# Patient Record
Sex: MALE | Race: BLACK OR AFRICAN AMERICAN | NOT HISPANIC OR LATINO | Employment: OTHER | URBAN - METROPOLITAN AREA
[De-identification: names, ages, dates, MRNs, and addresses within clinical notes are randomized per-mention and may not be internally consistent; named-entity substitution may affect disease eponyms.]

---

## 2023-03-03 LAB
ACTIVATED PARTIAL THROMBOPLASTIN TIME IN PPP BY COAGULATION ASSAY: 33 SEC (ref 26–39)
ALANINE AMINOTRANSFERASE (SGPT) (U/L) IN SER/PLAS: 10 U/L (ref 10–52)
ALBUMIN (G/DL) IN SER/PLAS: 4.2 G/DL (ref 3.4–5)
ALKALINE PHOSPHATASE (U/L) IN SER/PLAS: 113 U/L (ref 33–136)
ANION GAP IN SER/PLAS: 14 MMOL/L (ref 10–20)
ASPARTATE AMINOTRANSFERASE (SGOT) (U/L) IN SER/PLAS: 26 U/L (ref 9–39)
BILIRUBIN TOTAL (MG/DL) IN SER/PLAS: 0.5 MG/DL (ref 0–1.2)
C REACTIVE PROTEIN (MG/L) IN SER/PLAS BY HIGH SENSIT: 2.4 MG/L
CALCIUM (MG/DL) IN SER/PLAS: 9.7 MG/DL (ref 8.6–10.6)
CARBON DIOXIDE, TOTAL (MMOL/L) IN SER/PLAS: 28 MMOL/L (ref 21–32)
CHLORIDE (MMOL/L) IN SER/PLAS: 104 MMOL/L (ref 98–107)
CREATININE (MG/DL) IN SER/PLAS: 1.37 MG/DL (ref 0.5–1.3)
ERYTHROCYTE DISTRIBUTION WIDTH (RATIO) BY AUTOMATED COUNT: 13.3 % (ref 11.5–14.5)
ERYTHROCYTE MEAN CORPUSCULAR HEMOGLOBIN CONCENTRATION (G/DL) BY AUTOMATED: 32.1 G/DL (ref 32–36)
ERYTHROCYTE MEAN CORPUSCULAR VOLUME (FL) BY AUTOMATED COUNT: 96 FL (ref 80–100)
ERYTHROCYTES (10*6/UL) IN BLOOD BY AUTOMATED COUNT: 4.72 X10E12/L (ref 4.5–5.9)
GFR MALE: 53 ML/MIN/1.73M2
GLUCOSE (MG/DL) IN SER/PLAS: 68 MG/DL (ref 74–99)
HEMATOCRIT (%) IN BLOOD BY AUTOMATED COUNT: 45.5 % (ref 41–52)
HEMOGLOBIN (G/DL) IN BLOOD: 14.6 G/DL (ref 13.5–17.5)
INR IN PPP BY COAGULATION ASSAY: 1 (ref 0.9–1.1)
LEUKOCYTES (10*3/UL) IN BLOOD BY AUTOMATED COUNT: 5.6 X10E9/L (ref 4.4–11.3)
NRBC (PER 100 WBCS) BY AUTOMATED COUNT: 0 /100 WBC (ref 0–0)
PLATELETS (10*3/UL) IN BLOOD AUTOMATED COUNT: 171 X10E9/L (ref 150–450)
POTASSIUM (MMOL/L) IN SER/PLAS: 4 MMOL/L (ref 3.5–5.3)
PROTEIN TOTAL: 6.6 G/DL (ref 6.4–8.2)
PROTHROMBIN TIME (PT) IN PPP BY COAGULATION ASSAY: 11.8 SEC (ref 9.8–13.4)
SODIUM (MMOL/L) IN SER/PLAS: 142 MMOL/L (ref 136–145)
UREA NITROGEN (MG/DL) IN SER/PLAS: 18 MG/DL (ref 6–23)

## 2023-03-06 LAB
ANA PATTERN: ABNORMAL
ANA TITER: ABNORMAL
ANTI-CENTROMERE: <0.2 AI
ANTI-CHROMATIN: <0.2 AI
ANTI-DNA (DS): 1 IU/ML
ANTI-JO-1 IGG: <0.2 AI
ANTI-NUCLEAR ANTIBODY (ANA): POSITIVE
ANTI-RIBOSOMAL P: <0.2 AI
ANTI-RNP: <0.2 AI
ANTI-SCL-70: <0.2 AI
ANTI-SM/RNP: <0.2 AI
ANTI-SM: <0.2 AI
ANTI-SSA: <0.2 AI
ANTI-SSB: <0.2 AI
RHEUMATOID FACTOR (IU/ML) IN SERUM OR PLASMA: <10 IU/ML (ref 0–15)
SEDIMENTATION RATE, ERYTHROCYTE: 6 MM/H (ref 0–20)
URATE (MG/DL) IN SER/PLAS: 6.1 MG/DL (ref 4–7.5)

## 2023-03-11 DIAGNOSIS — E78.5 HYPERLIPIDEMIA, UNSPECIFIED HYPERLIPIDEMIA TYPE: Primary | ICD-10-CM

## 2023-03-11 RX ORDER — ATORVASTATIN CALCIUM 40 MG/1
1 TABLET, FILM COATED ORAL NIGHTLY
COMMUNITY
Start: 2019-04-01 | End: 2023-03-11 | Stop reason: SDUPTHER

## 2023-03-11 RX ORDER — ATORVASTATIN CALCIUM 40 MG/1
40 TABLET, FILM COATED ORAL NIGHTLY
Qty: 90 TABLET | Refills: 3 | Status: SHIPPED | OUTPATIENT
Start: 2023-03-11

## 2023-04-04 ENCOUNTER — OFFICE VISIT (OUTPATIENT)
Dept: PRIMARY CARE | Facility: CLINIC | Age: 78
End: 2023-04-04
Payer: MEDICARE

## 2023-04-04 VITALS
SYSTOLIC BLOOD PRESSURE: 130 MMHG | TEMPERATURE: 98.5 F | OXYGEN SATURATION: 98 % | BODY MASS INDEX: 23 KG/M2 | HEART RATE: 133 BPM | DIASTOLIC BLOOD PRESSURE: 83 MMHG | WEIGHT: 185 LBS | HEIGHT: 75 IN

## 2023-04-04 DIAGNOSIS — I10 PRIMARY HYPERTENSION: ICD-10-CM

## 2023-04-04 DIAGNOSIS — Z87.2: Primary | ICD-10-CM

## 2023-04-04 PROBLEM — F17.200 NICOTINE DEPENDENCE, UNSPECIFIED, UNCOMPLICATED: Status: ACTIVE | Noted: 2023-04-04

## 2023-04-04 PROBLEM — R25.1 TREMOR, UNSPECIFIED: Status: ACTIVE | Noted: 2023-04-04

## 2023-04-04 PROBLEM — Z96.1 PSEUDOPHAKIA OF LEFT EYE: Status: ACTIVE | Noted: 2023-04-04

## 2023-04-04 PROBLEM — I70.90 ATHEROSCLEROTIC VASCULAR DISEASE: Status: ACTIVE | Noted: 2023-04-04

## 2023-04-04 PROBLEM — H53.8 BLURRY VISION: Status: ACTIVE | Noted: 2023-04-04

## 2023-04-04 PROBLEM — N18.32 STAGE 3B CHRONIC KIDNEY DISEASE (MULTI): Status: ACTIVE | Noted: 2023-04-04
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
## 2023-04-04 PROBLEM — H52.13 MYOPIA OF BOTH EYES WITH REGULAR ASTIGMATISM: Status: ACTIVE | Noted: 2023-04-04

## 2023-04-04 PROBLEM — H52.02 HYPERMETROPIA OF LEFT EYE: Status: ACTIVE | Noted: 2023-04-04

## 2023-04-04 PROBLEM — H43.811 PVD (POSTERIOR VITREOUS DETACHMENT), RIGHT EYE: Status: ACTIVE | Noted: 2023-04-04

## 2023-04-04 PROBLEM — G20.A1 PARKINSON'S DISEASE (MULTI): Status: ACTIVE | Noted: 2023-04-04

## 2023-04-04 PROBLEM — E87.6 HYPOKALEMIA: Status: ACTIVE | Noted: 2023-04-04

## 2023-04-04 PROBLEM — H52.223 REGULAR ASTIGMATISM OF BOTH EYES WITH PRESBYOPIA: Status: ACTIVE | Noted: 2023-04-04

## 2023-04-04 PROBLEM — I72.4 ANEURYSM OF POPLITEAL ARTERY (CMS-HCC): Status: ACTIVE | Noted: 2023-04-04

## 2023-04-04 PROBLEM — F17.210 CIGARETTE NICOTINE DEPENDENCE WITHOUT COMPLICATION: Status: ACTIVE | Noted: 2023-04-04

## 2023-04-04 PROBLEM — L85.3 DRY SKIN: Status: ACTIVE | Noted: 2023-04-04

## 2023-04-04 PROBLEM — H25.812 COMBINED FORM OF AGE-RELATED CATARACT, LEFT EYE: Status: ACTIVE | Noted: 2023-04-04

## 2023-04-04 PROBLEM — H52.223 MYOPIA OF BOTH EYES WITH REGULAR ASTIGMATISM: Status: ACTIVE | Noted: 2023-04-04

## 2023-04-04 PROBLEM — Z96.1 PSEUDOPHAKIA OF RIGHT EYE: Status: ACTIVE | Noted: 2023-04-04

## 2023-04-04 PROBLEM — H52.4 REGULAR ASTIGMATISM OF BOTH EYES WITH PRESBYOPIA: Status: ACTIVE | Noted: 2023-04-04

## 2023-04-04 PROBLEM — F17.210 SMOKES WITH GREATER THAN 40 PACK YEAR HISTORY: Status: ACTIVE | Noted: 2023-04-04

## 2023-04-04 PROBLEM — K59.00 CONSTIPATION: Status: ACTIVE | Noted: 2023-04-04

## 2023-04-04 PROBLEM — E78.00 HYPERCHOLESTEREMIA: Status: ACTIVE | Noted: 2023-04-04

## 2023-04-04 PROBLEM — F17.200 TOBACCO DEPENDENCE: Status: ACTIVE | Noted: 2023-04-04

## 2023-04-04 PROBLEM — H52.11 MYOPIA OF RIGHT EYE: Status: ACTIVE | Noted: 2023-04-04

## 2023-04-04 PROBLEM — I71.43 ANEURYSM OF INFRARENAL ABDOMINAL AORTA (CMS-HCC): Status: ACTIVE | Noted: 2023-04-04

## 2023-04-04 PROBLEM — H25.813 COMBINED FORM OF SENILE CATARACT OF BOTH EYES: Status: ACTIVE | Noted: 2023-04-04

## 2023-04-04 PROBLEM — Z95.828 PRESENCE OF OTHER VASCULAR IMPLANTS AND GRAFTS: Status: ACTIVE | Noted: 2023-04-04

## 2023-04-04 PROBLEM — B35.9 TINEA: Status: ACTIVE | Noted: 2023-04-04

## 2023-04-04 PROCEDURE — 4004F PT TOBACCO SCREEN RCVD TLK: CPT | Performed by: STUDENT IN AN ORGANIZED HEALTH CARE EDUCATION/TRAINING PROGRAM

## 2023-04-04 PROCEDURE — 99214 OFFICE O/P EST MOD 30 MIN: CPT | Performed by: STUDENT IN AN ORGANIZED HEALTH CARE EDUCATION/TRAINING PROGRAM

## 2023-04-04 PROCEDURE — 3075F SYST BP GE 130 - 139MM HG: CPT | Performed by: STUDENT IN AN ORGANIZED HEALTH CARE EDUCATION/TRAINING PROGRAM

## 2023-04-04 PROCEDURE — 3079F DIAST BP 80-89 MM HG: CPT | Performed by: STUDENT IN AN ORGANIZED HEALTH CARE EDUCATION/TRAINING PROGRAM

## 2023-04-04 PROCEDURE — 1159F MED LIST DOCD IN RCRD: CPT | Performed by: STUDENT IN AN ORGANIZED HEALTH CARE EDUCATION/TRAINING PROGRAM

## 2023-04-04 RX ORDER — ASPIRIN 81 MG/1
1 TABLET ORAL DAILY
COMMUNITY
Start: 2013-11-13

## 2023-04-04 RX ORDER — TRIAMTERENE AND HYDROCHLOROTHIAZIDE 37.5; 25 MG/1; MG/1
1 CAPSULE ORAL DAILY PRN
COMMUNITY
Start: 2020-09-30 | End: 2023-04-04 | Stop reason: SDUPTHER

## 2023-04-04 RX ORDER — NAPROXEN SODIUM 220 MG/1
1 TABLET, FILM COATED ORAL DAILY
COMMUNITY
Start: 2016-08-25

## 2023-04-04 RX ORDER — MULTIVIT-MINS/IRON/FOLIC/LYCOP 8-200-600
1 TABLET ORAL DAILY
COMMUNITY
Start: 2022-11-08

## 2023-04-04 RX ORDER — METOPROLOL SUCCINATE 25 MG/1
25 TABLET, EXTENDED RELEASE ORAL DAILY
Qty: 90 TABLET | Refills: 3 | Status: SHIPPED | OUTPATIENT
Start: 2023-04-04

## 2023-04-04 RX ORDER — CARBIDOPA AND LEVODOPA 25; 100 MG/1; MG/1
1 TABLET ORAL 3 TIMES DAILY
COMMUNITY
Start: 2021-12-16

## 2023-04-04 RX ORDER — METOPROLOL SUCCINATE 25 MG/1
1 TABLET, EXTENDED RELEASE ORAL DAILY
COMMUNITY
Start: 2017-05-10 | End: 2023-04-04 | Stop reason: SDUPTHER

## 2023-04-04 RX ORDER — TRIAMTERENE AND HYDROCHLOROTHIAZIDE 37.5; 25 MG/1; MG/1
1 CAPSULE ORAL EVERY MORNING
Qty: 90 CAPSULE | Refills: 3 | Status: SHIPPED | OUTPATIENT
Start: 2023-04-04

## 2023-04-04 RX ORDER — METOPROLOL TARTRATE 25 MG/1
1 TABLET, FILM COATED ORAL DAILY
COMMUNITY
End: 2023-04-04 | Stop reason: ALTCHOICE

## 2023-04-04 ASSESSMENT — ENCOUNTER SYMPTOMS
DEPRESSION: 0
LOSS OF SENSATION IN FEET: 0
OCCASIONAL FEELINGS OF UNSTEADINESS: 0

## 2023-04-04 ASSESSMENT — PAIN SCALES - GENERAL: PAINLEVEL: 0-NO PAIN

## 2023-04-04 NOTE — PROGRESS NOTES
Subjective   Patient ID: Jonny Gonzalez is a 77 y.o. male who presents for Follow-up and Med Refill.    HPI    Mr. 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   HENT:  Negative for congestion.    Eyes:  Negative for pain.   Respiratory:  Negative for cough, chest tightness, shortness of breath and wheezing.    Cardiovascular:  Negative for chest pain, palpitations and leg swelling.   Gastrointestinal:  Negative for abdominal distention, abdominal pain, constipation, diarrhea, nausea and vomiting.   Genitourinary:  Negative for dysuria.   Musculoskeletal:  Negative for arthralgias, back pain, joint swelling and myalgias.   Skin:  Negative for color change and rash.   Neurological:  Negative for weakness, numbness and headaches.   Psychiatric/Behavioral:  Negative for behavioral problems.        Objective   Physical Exam  Constitutional:       Appearance: Normal appearance. He is normal weight.   HENT:      Head: Normocephalic and atraumatic.      Nose: Nose normal.   Eyes:      Extraocular Movements: Extraocular movements intact.      Conjunctiva/sclera: Conjunctivae normal.      Pupils: Pupils are equal, round, and reactive to light.   Cardiovascular:      Rate and Rhythm: Normal rate and regular rhythm.      Pulses: Normal pulses.      Heart sounds: Normal heart sounds.   Pulmonary:      Effort: Pulmonary effort is normal.       "Breath sounds: Normal breath sounds.   Abdominal:      General: Abdomen is flat. Bowel sounds are normal.      Palpations: Abdomen is soft.   Musculoskeletal:         General: Normal range of motion.      Cervical back: Normal range of motion and neck supple.   Skin:     General: Skin is warm and dry.      Capillary Refill: Capillary refill takes less than 2 seconds.   Neurological:      General: No focal deficit present.      Mental Status: He is alert and oriented to person, place, and time. Mental status is at baseline.      Gait: Gait abnormal.      Comments: Chronic tremors   Psychiatric:         Mood and Affect: Mood normal.         Behavior: Behavior normal.       /83 (BP Location: Right arm, Patient Position: Sitting)   Pulse (!) 133   Temp 36.9 °C (98.5 °F)   Ht 1.905 m (6' 3\")   Wt 83.9 kg (185 lb)   SpO2 98%   BMI 23.12 kg/m²      Assessment/Plan   Problem List Items Addressed This Visit    None    Mr. 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, HLD, resting tremor, and HTN presenting today for rash on lower bilateral knees:    #Skin hyperpigmentation of bilateral knees  - Lesions on bilateral knees with hyperpigmentation that is minimally blanchable that appears to be possibly vascular in nature  - Unclear if drug reaction (Carbidopa-Levodopa?)  - CRP, ESR, CMP, CBC, and Arthiritis Panel ordered previously to rule-out systemic origin of symptoms with no clear origin identified  - Dermatology referral for further review    #Hypertension  - Refilled Metop Succinate 25mg PO daily  - Refilled Triamterene-hydrochlorothiazide 37.5mg-25mg PO daily  - Monitor Bps periodically at home    Plan to follow-up in 1 month or sooner if symptoms worsen    Patient seen and discussed with Dr. Bucky Glynn MD  Family and Preventive Medicine  PGY-3   "

## 2023-04-10 ASSESSMENT — ENCOUNTER SYMPTOMS
PALPITATIONS: 0
WEAKNESS: 0
HEADACHES: 0
COLOR CHANGE: 0
MYALGIAS: 0
WHEEZING: 0
ABDOMINAL PAIN: 0
NUMBNESS: 0
NAUSEA: 0
DYSURIA: 0
BACK PAIN: 0
CHILLS: 0
COUGH: 0
VOMITING: 0
EYE PAIN: 0
FEVER: 0
CONSTIPATION: 0
CHEST TIGHTNESS: 0
SHORTNESS OF BREATH: 0
JOINT SWELLING: 0
ARTHRALGIAS: 0
ABDOMINAL DISTENTION: 0
DIARRHEA: 0

## 2023-04-11 NOTE — PROGRESS NOTES
I saw and evaluated the patient. I personally obtained the key and critical portions of the history and physical exam or was physically present for key and critical portions performed by the resident/fellow. I reviewed the resident/fellow's documentation and discussed the patient with the resident/fellow. I agree with the resident/fellow's medical decision making as documented in the note with the exception/addition of the following:  Here for follow up leg lesions: telangiectasis?? : work up reviewed and mostly negative: derm refrral  HTN: meds refilled.  Soledad Lawler MD

## 2023-12-06 ENCOUNTER — OFFICE VISIT (OUTPATIENT)
Dept: VASCULAR SURGERY | Facility: HOSPITAL | Age: 78
End: 2023-12-06
Payer: MEDICARE

## 2023-12-06 VITALS
HEART RATE: 78 BPM | HEIGHT: 75 IN | WEIGHT: 186 LBS | DIASTOLIC BLOOD PRESSURE: 91 MMHG | BODY MASS INDEX: 23.13 KG/M2 | OXYGEN SATURATION: 97 % | SYSTOLIC BLOOD PRESSURE: 143 MMHG

## 2023-12-06 DIAGNOSIS — I71.43 INFRARENAL ABDOMINAL AORTIC ANEURYSM (AAA) WITHOUT RUPTURE (CMS-HCC): Primary | ICD-10-CM

## 2023-12-06 DIAGNOSIS — I72.4 ANEURYSM OF POPLITEAL ARTERY (CMS-HCC): ICD-10-CM

## 2023-12-06 PROCEDURE — 3080F DIAST BP >= 90 MM HG: CPT | Performed by: NURSE PRACTITIONER

## 2023-12-06 PROCEDURE — 99204 OFFICE O/P NEW MOD 45 MIN: CPT | Performed by: NURSE PRACTITIONER

## 2023-12-06 PROCEDURE — 3077F SYST BP >= 140 MM HG: CPT | Performed by: NURSE PRACTITIONER

## 2023-12-06 PROCEDURE — 1126F AMNT PAIN NOTED NONE PRSNT: CPT | Performed by: NURSE PRACTITIONER

## 2023-12-06 PROCEDURE — 99214 OFFICE O/P EST MOD 30 MIN: CPT | Performed by: NURSE PRACTITIONER

## 2023-12-06 PROCEDURE — 1159F MED LIST DOCD IN RCRD: CPT | Performed by: NURSE PRACTITIONER

## 2023-12-06 PROCEDURE — 4004F PT TOBACCO SCREEN RCVD TLK: CPT | Performed by: NURSE PRACTITIONER

## 2023-12-06 RX ORDER — METHOCARBAMOL 750 MG/1
TABLET, FILM COATED ORAL
COMMUNITY
Start: 2023-11-30

## 2023-12-06 ASSESSMENT — PAIN SCALES - GENERAL: PAINLEVEL: 0-NO PAIN

## 2023-12-06 NOTE — PROGRESS NOTES
Vascular Surgery Clinic Note    CC: NPV     History Of Present Illness:   Jonny Gonzalez is a 77 y.o. male here for initial evaluation.  He underwent an EVAR in 2014 by Dr. Rausch.  Has a known right popliteal artery aneurysm.  Over the past 2 months he has experienced right leg discomfort with walking less than 100 yards distance.  He denies nocturnal rest pain or tissue loss.  He denies any known history of diabetes.  He smokes 1 pack/day.  He denies any known family history of aneurysmal disease.  His blood pressure is well controlled at home.    He denies amaurosis fugax or TIA symptoms.    Last CT was performed in 2015.     Medical History:  Patient Active Problem List   Diagnosis    Aneurysm of infrarenal abdominal aorta (CMS/HCC)    Aneurysm of popliteal artery (CMS/HCC)    Atherosclerotic vascular disease    Blurry vision    Combined form of age-related cataract, left eye    Combined form of senile cataract of both eyes    Constipation    Dry skin    Hypercholesteremia    Hypermetropia of left eye    Hypertension    Hypokalemia    Insect bite    Myopia of both eyes with regular astigmatism    Myopia of right eye    Parkinson's disease    Presence of other vascular implants and grafts    Pseudophakia of left eye    Pseudophakia of right eye    PVD (posterior vitreous detachment), right eye    Regular astigmatism of both eyes with presbyopia    Smokes with greater than 40 pack year history    Stage 3b chronic kidney disease (CMS/HCC)    Tinea    Nicotine dependence, unspecified, uncomplicated    Tobacco dependence    Tremor, unspecified    Cigarette nicotine dependence without complication    Endoleak post (EVAR) endovascular aneurysm repair, initial encounter    Intertrigo    Low back pain    Nail disease    Prediabetes    Pruritus ani    Subconjunctival hemorrhage        SH:    Social Determinants of Health     Tobacco Use: High Risk (12/6/2023)    Patient History     Smoking Tobacco Use: Every Day      "Smokeless Tobacco Use: Never     Passive Exposure: Not on file   Alcohol Use: Not on file   Financial Resource Strain: Not on file   Food Insecurity: Not on file   Transportation Needs: Not on file   Physical Activity: Not on file   Stress: Not on file   Social Connections: Not on file   Intimate Partner Violence: Not on file   Depression: Not on file   Housing Stability: Not on file   Utilities: Not on file   Digital Equity: Not on file        FH:  No family history on file.     Allergies:   No Known Allergies    ROS:  All systems were reviewed and noted to be negative, other than described above.     Objective:  Last Recorded Vitals  Blood pressure (!) 143/91, pulse 78, height 1.905 m (6' 3\"), weight 84.4 kg (186 lb), SpO2 97 %.    Meds:   Current Outpatient Medications   Medication Instructions    aspirin 81 mg chewable tablet 1 tablet, oral, Daily    aspirin 81 mg EC tablet 1 tablet, oral, Daily    atorvastatin (LIPITOR) 40 mg, oral, Nightly    carbidopa-levodopa (Sinemet)  mg tablet 1 tablet, oral, 3 times daily    methocarbamol (Robaxin) 750 mg tablet TAKE 1 TABLET BY MOUTH EVERY 8 HOURS AS NEEDED FOR LEG PAIN OR CRAMPING    metoprolol succinate XL (TOPROL-XL) 25 mg, oral, Daily    multivit-min-FA-lycopen-lutein (Men 50 Plus Multivitamin) 300-600-300 mcg tablet 1 tablet, oral, Daily    triamterene-hydrochlorothiazid (Dyazide) 37.5-25 mg capsule 1 capsule, oral, Every morning       Exam:  Constitutional: Well appearing, NAD   PSYCH: Appropriate mood and affect  Eyes: Sclera clear   Neck: Supple, no carotid bruits noted.   CV: No tachycardia   RESP: Unlabored breathing   GI: Soft, nontender, non-distended. Non tender and nonaneurysmal abdominal aorta.   SKIN: No lesions  NEURO: No focal deficits noted. Motor/sensory intact.   EXTREMITIES: Warm & well perfused. No leg edema. No evidence of arterial ischemia. No evidence of venous insufficiency.   PULSES: palpable femoral pulse bilaterally. Palpable L DP. " Monophasic right PT and peroneal signal. No audible AT/DP doppler signal. Nonpalpable right popliteal pulse.     Assessment & Plan:  1. Infrarenal abdominal aortic aneurysm (AAA) without rupture (CMS/HCC)  CT angio aorta and bilateral iliofemoral runoff w and or wo IV contrast    CT angio aorta and bilateral iliofemoral runoff w and or wo IV contrast      2. Aneurysm of popliteal artery (CMS/HCC)  CT angio aorta and bilateral iliofemoral runoff w and or wo IV contrast    Vascular US ankle brachial index (TITA) without exercise    CT angio aorta and bilateral iliofemoral runoff w and or wo IV contrast        AAA s/p EVAR 2014  Right popliteal artery aneurysm - new claudication symptoms, no rest pain or tissue loss.   - obtain CTA aorta with runoff (instructed patient on oral hydration prior to scan)   - obtain TITA  - smoking cessation   - RTC after testing     Orders:  Orders Placed This Encounter   Procedures    CT angio aorta and bilateral iliofemoral runoff w and or wo IV contrast    CT angio aorta and bilateral iliofemoral runoff w and or wo IV contrast     Follow-up:  After testing     SERINA Gorman-CNP

## 2023-12-20 ENCOUNTER — HOSPITAL ENCOUNTER (OUTPATIENT)
Dept: VASCULAR MEDICINE | Facility: HOSPITAL | Age: 78
Discharge: HOME | End: 2023-12-20
Payer: MEDICARE

## 2023-12-20 DIAGNOSIS — I71.43 INFRARENAL ABDOMINAL AORTIC ANEURYSM, WITHOUT RUPTURE (CMS-HCC): Primary | ICD-10-CM

## 2023-12-20 DIAGNOSIS — I73.89 OTHER SPECIFIED PERIPHERAL VASCULAR DISEASES (CMS-HCC): ICD-10-CM

## 2023-12-20 DIAGNOSIS — I72.4 ANEURYSM OF POPLITEAL ARTERY (CMS-HCC): ICD-10-CM

## 2023-12-20 PROCEDURE — 93922 UPR/L XTREMITY ART 2 LEVELS: CPT

## 2023-12-20 PROCEDURE — 93922 UPR/L XTREMITY ART 2 LEVELS: CPT | Performed by: SURGERY

## 2023-12-21 ENCOUNTER — LAB (OUTPATIENT)
Dept: LAB | Facility: HOSPITAL | Age: 78
End: 2023-12-21
Payer: MEDICARE

## 2023-12-21 ENCOUNTER — HOSPITAL ENCOUNTER (OUTPATIENT)
Dept: RADIOLOGY | Facility: HOSPITAL | Age: 78
Discharge: HOME | End: 2023-12-21
Payer: MEDICARE

## 2023-12-21 DIAGNOSIS — I72.4 ANEURYSM OF POPLITEAL ARTERY (CMS-HCC): ICD-10-CM

## 2023-12-21 DIAGNOSIS — I71.43 INFRARENAL ABDOMINAL AORTIC ANEURYSM (AAA) WITHOUT RUPTURE (CMS-HCC): ICD-10-CM

## 2023-12-21 DIAGNOSIS — I71.43 INFRARENAL ABDOMINAL AORTIC ANEURYSM, WITHOUT RUPTURE (CMS-HCC): ICD-10-CM

## 2023-12-21 LAB
CREAT SERPL-MCNC: 1.26 MG/DL (ref 0.5–1.3)
GFR SERPL CREATININE-BSD FRML MDRD: 59 ML/MIN/1.73M*2

## 2023-12-21 PROCEDURE — 36415 COLL VENOUS BLD VENIPUNCTURE: CPT

## 2023-12-21 PROCEDURE — 82565 ASSAY OF CREATININE: CPT

## 2023-12-21 PROCEDURE — 75635 CT ANGIO ABDOMINAL ARTERIES: CPT | Performed by: RADIOLOGY

## 2023-12-21 PROCEDURE — 75635 CT ANGIO ABDOMINAL ARTERIES: CPT

## 2023-12-21 PROCEDURE — 2550000001 HC RX 255 CONTRASTS: Performed by: NURSE PRACTITIONER

## 2023-12-21 RX ADMIN — IOHEXOL 100 ML: 350 INJECTION, SOLUTION INTRAVENOUS at 14:55

## 2023-12-25 ENCOUNTER — HOSPITAL ENCOUNTER (EMERGENCY)
Facility: HOSPITAL | Age: 78
Discharge: HOME | End: 2023-12-25
Attending: EMERGENCY MEDICINE
Payer: MEDICARE

## 2023-12-25 VITALS
TEMPERATURE: 98.1 F | DIASTOLIC BLOOD PRESSURE: 85 MMHG | SYSTOLIC BLOOD PRESSURE: 151 MMHG | OXYGEN SATURATION: 97 % | WEIGHT: 187 LBS | BODY MASS INDEX: 24 KG/M2 | RESPIRATION RATE: 16 BRPM | HEART RATE: 81 BPM | HEIGHT: 74 IN

## 2023-12-25 DIAGNOSIS — I73.9 PVD (PERIPHERAL VASCULAR DISEASE) WITH CLAUDICATION (CMS-HCC): Primary | ICD-10-CM

## 2023-12-25 DIAGNOSIS — I72.4 POPLITEAL ANEURYSM (CMS-HCC): ICD-10-CM

## 2023-12-25 PROCEDURE — 2500000004 HC RX 250 GENERAL PHARMACY W/ HCPCS (ALT 636 FOR OP/ED)

## 2023-12-25 PROCEDURE — 99283 EMERGENCY DEPT VISIT LOW MDM: CPT | Mod: 25

## 2023-12-25 PROCEDURE — 2500000005 HC RX 250 GENERAL PHARMACY W/O HCPCS

## 2023-12-25 PROCEDURE — 99284 EMERGENCY DEPT VISIT MOD MDM: CPT | Performed by: EMERGENCY MEDICINE

## 2023-12-25 PROCEDURE — 99284 EMERGENCY DEPT VISIT MOD MDM: CPT | Mod: 25 | Performed by: EMERGENCY MEDICINE

## 2023-12-25 PROCEDURE — 96372 THER/PROPH/DIAG INJ SC/IM: CPT

## 2023-12-25 RX ORDER — LIDOCAINE 560 MG/1
1 PATCH PERCUTANEOUS; TOPICAL; TRANSDERMAL DAILY
Status: DISCONTINUED | OUTPATIENT
Start: 2023-12-25 | End: 2023-12-25 | Stop reason: HOSPADM

## 2023-12-25 RX ORDER — LIDOCAINE 560 MG/1
1 PATCH PERCUTANEOUS; TOPICAL; TRANSDERMAL DAILY
Status: DISCONTINUED | OUTPATIENT
Start: 2023-12-25 | End: 2023-12-25

## 2023-12-25 RX ORDER — LIDOCAINE 50 MG/G
1 PATCH TOPICAL DAILY
Qty: 5 PATCH | Refills: 0 | Status: SHIPPED | OUTPATIENT
Start: 2023-12-25 | End: 2023-12-30

## 2023-12-25 RX ORDER — METHOCARBAMOL 100 MG/ML
750 INJECTION, SOLUTION INTRAMUSCULAR; INTRAVENOUS ONCE
Status: COMPLETED | OUTPATIENT
Start: 2023-12-25 | End: 2023-12-25

## 2023-12-25 RX ADMIN — LIDOCAINE 1 PATCH: 4 PATCH TOPICAL at 11:39

## 2023-12-25 RX ADMIN — METHOCARBAMOL 750 MG: 1000 INJECTION, SOLUTION INTRAMUSCULAR; INTRAVENOUS at 11:39

## 2023-12-25 ASSESSMENT — PAIN DESCRIPTION - ORIENTATION: ORIENTATION: RIGHT

## 2023-12-25 ASSESSMENT — PAIN SCALES - GENERAL: PAINLEVEL_OUTOF10: 10 - WORST POSSIBLE PAIN

## 2023-12-25 ASSESSMENT — COLUMBIA-SUICIDE SEVERITY RATING SCALE - C-SSRS
2. HAVE YOU ACTUALLY HAD ANY THOUGHTS OF KILLING YOURSELF?: NO
1. IN THE PAST MONTH, HAVE YOU WISHED YOU WERE DEAD OR WISHED YOU COULD GO TO SLEEP AND NOT WAKE UP?: NO
6. HAVE YOU EVER DONE ANYTHING, STARTED TO DO ANYTHING, OR PREPARED TO DO ANYTHING TO END YOUR LIFE?: NO

## 2023-12-25 ASSESSMENT — PAIN - FUNCTIONAL ASSESSMENT: PAIN_FUNCTIONAL_ASSESSMENT: 0-10

## 2023-12-25 ASSESSMENT — PAIN DESCRIPTION - LOCATION: LOCATION: LEG

## 2023-12-25 NOTE — DISCHARGE INSTRUCTIONS
Follow up with vascular by making an appointment within 48 hours for further management of your right leg pain, which is likely due to both the aneurysm behind the knee and the peripheral vessel disease in the right internal iliac artery and right superficial femoral artery. Return to the ER for any persistent or worsening symptoms.

## 2023-12-25 NOTE — ED PROVIDER NOTES
HPI   Chief Complaint   Patient presents with    Leg Pain       HPI       77 year old male patient with history of R popliteal artery aneurysm and infrarenal AAA s/p EVAR 2014 arriving with R leg pain for two months that occurs both at rest and when he walks. He has steady pain and took OTC tylenol this AM. The pain progressed today bringing him to the ER. It is distributed along his R lateral thigh with tightness extending from his back to his knee.     December CT Angio Aorta b/l iliofem runoff showed R common iliac a stent is patent with R internal iliac and R superficial femoral a moderate to high grade stenosis and a thrombosed R popliteal aneurysm of 1.7 x 1.4 x 2.1 cm.     Vascular US ABIndex shows mild R arterial occlusive disease.  Repeat ABIs here > 0.8 ratios.  Pulses intact in Right lateral marginal vein.            Irene Coma Scale Score: 15                  Patient History   Past Medical History:   Diagnosis Date    Nicotine dependence, cigarettes, uncomplicated 04/30/2020    Cigarette nicotine dependence without complication     Past Surgical History:   Procedure Laterality Date    CT ABDOMEN PELVIS ANGIOGRAM W AND/OR WO IV CONTRAST  5/16/2014    CT ABDOMEN PELVIS ANGIOGRAM W AND/OR WO IV CONTRAST [place] [facility] [place]    CT ABDOMEN PELVIS ANGIOGRAM W AND/OR WO IV CONTRAST  7/22/2014    CT ABDOMEN PELVIS ANGIOGRAM W AND/OR WO IV CONTRAST [place] [facility] [place] [place]    CT ABDOMEN PELVIS ANGIOGRAM W AND/OR WO IV CONTRAST  12/21/2015    CT ABDOMEN PELVIS ANGIOGRAM W AND/OR WO IV CONTRAST [place] [facility] [place] [facility]    CT AORTA AND BILATERAL ILIOFEMORAL RUNOFF ANGIOGRAM W AND/OR WO IV CONTRAST  12/21/2023    CT AORTA AND BILATERAL ILIOFEMORAL RUNOFF ANGIOGRAM W AND/OR WO IV CONTRAST 12/21/2023 [facility] CT    IR ANGIOGRAM AORTA ABDOMEN  5/27/2014    IR ANGIOGRAM AORTA ABDOMEN [place]27/2014 [facility] [place] [facility]    IR INTERVENTION VENOUS ARTERIAL PTA  5/27/2014    IR INTERVENTION VENOUS ARTERIAL  [place]27/[place] [facility] [place]    OTHER SURGICAL HISTORY  12/05/2019    No history of surgery     No family history on file.  Social History     Tobacco Use    Smoking status: Every Day     Packs/day: 1.00     Years: 50.00     Additional pack years: 0.00     Total pack years: 50.00     Types: Cigarettes    Smokeless tobacco: Never   Substance Use Topics    Alcohol use: Never    Drug use: Never       Physical Exam   ED Triage Vitals   Temp Heart Rate Resp BP   12/25/23 0958 12/25/23 0958 12/25/23 0958 12/25/23 0958   36.3 °C (97.3 °F) 69 16 (!) 159/96      SpO2 Temp src Heart Rate Source Patient Position   12/25/23 0958 -- 12/25/23 1018 --   93 %  Monitor       BP Location FiO2 (%)     12/25/23 1018 --     Right arm        Physical Exam  General: Alert, mild acute distress, well developed, Well hydrated  Head: NCAT  Eyes: Clear conjunctiva, EOMI  ENT: Moist mucosa, no lymphadenopathy  Respiratory: Normal respiratory effort. CTAB. Symmetric aeration. No wheezes, rales, or Rhonchi.  CV: Normal rhythm, Normal Rate, pulses present bilaterally  GI: Soft, NT, no guarding, BS normoactive, No distention, No hernia, No palpable mass.  : no flank tenderness, no cva tenderness  MSK: Atraumatic. Full ROM in extremities. Bilateral symmetric intact strength. No pedal edema.  Skin: Warm, c/d/I, discoloration in patches  Neuro: AOx3, facial symmetry, Resting tremor  sensorimotor intact in extremities,   CN intact, Nonfocal neurological exam  ED Course & MDM   Diagnoses as of 12/25/23 1131   PVD (peripheral vascular disease) with claudication (CMS/[facility])   Popliteal aneurysm (CMS/[facility])       Medical Decision Making      77 year old male patient with history of R popliteal artery aneurysm and infrarenal AAA s/p EVAR 2014 arriving with R leg pain for two months that occurs both at rest and when he walks. He has steady pain and took OTC tylenol this AM. The pain progressed today bringing him to the ER. It is distributed along his R  lateral thigh with tightness extending from his back to his knee.     December CT Angio Aorta b/l iliofem runoff showed R common iliac a stent is patent with R internal iliac and R superficial femoral a moderate to high grade stenosis and a thrombosed R popliteal aneurysm of 1.7 x 1.4 x 2.1 cm.     Vascular US ABIndex shows mild R arterial occlusive disease.  Repeat ABIs here > 0.8 ratios.  Pulses intact in Right lateral marginal vein.    Robaxin and lidocaine provided for symptomatic relief.     The patient was told to follow up with vascular by making an appointment within 48 hours for further management of right leg pain, which is likely due to both the aneurysm behind the knee and the peripheral vessel disease in the right internal iliac artery and right superficial femoral artery. Return to the ER for any persistent or worsening symptoms. Anticipatory guidance provided.     External Records Reviewed: I reviewed recent and relevant outside records including: none  Independent Interpretation of Studies: I independently interpreted: As above  Social Determinants Affecting Care: Diagnostic testing considered: As above    I reviewed the case with the attending ER physician. Patient and/or patient´s representative was counseled regarding labs, imaging, likely diagnosis, and plan.     Jose E Anderson MD MS  PGY-1, Emergency Medicine    The above documentation was completed with the use of speech recognition software. It may contain dictation errors secondary to limitations of the software.      Jose E Anderson MD  Resident  12/25/23 4741

## 2023-12-29 ENCOUNTER — TELEMEDICINE (OUTPATIENT)
Dept: VASCULAR SURGERY | Facility: HOSPITAL | Age: 78
End: 2023-12-29
Payer: MEDICARE

## 2023-12-29 ENCOUNTER — APPOINTMENT (OUTPATIENT)
Dept: VASCULAR SURGERY | Facility: HOSPITAL | Age: 78
End: 2023-12-29
Payer: MEDICARE

## 2023-12-29 DIAGNOSIS — Z01.818 ENCOUNTER FOR OTHER PREPROCEDURAL EXAMINATION: ICD-10-CM

## 2023-12-29 DIAGNOSIS — I72.4 ANEURYSM OF POPLITEAL ARTERY (CMS-HCC): Primary | ICD-10-CM

## 2023-12-29 DIAGNOSIS — I65.23 CAROTID STENOSIS, ASYMPTOMATIC, BILATERAL: ICD-10-CM

## 2023-12-29 DIAGNOSIS — Z01.810 PREOPERATIVE CARDIOVASCULAR EXAMINATION: ICD-10-CM

## 2023-12-29 PROCEDURE — 99214 OFFICE O/P EST MOD 30 MIN: CPT | Performed by: NURSE PRACTITIONER

## 2023-12-29 NOTE — PROGRESS NOTES
Vascular Surgery Clinic Note    CC: [facility] telehealth     History Of Present Illness:   Jonny Gonzalez is a 78 y.o. male here for routine follow up He has a known right popliteal artery aneurysm that has been monitored for growth over the years. Two months ago, he developed right calf and foot pain with walking. The pain has progressively worsened and now occurs occasionally at nighttime. He has no wounds. He denies motor or sensory deficits.     He underwent an EVAR in 2014 by Dr. Rausch.     Recent CTA demonstrates patent endograft without evidence of endoleak. The right popliteal artery aneurysm is severely narrowed with mural thrombus and measures 1.9 cm. He has right SFA disease as well. TITA is 0.84 on the right and 1.14 on the left.     Medical History:  Patient Active Problem List   Diagnosis    Aneurysm of infrarenal abdominal aorta (CMS/HCC)    Aneurysm of popliteal artery (CMS/HCC)    Atherosclerotic vascular disease    Blurry vision    Combined form of age-related cataract, left eye    Combined form of senile cataract of both eyes    Constipation    Dry skin    Hypercholesteremia    Hypermetropia of left eye    Hypertension    Hypokalemia    Insect bite    Myopia of both eyes with regular astigmatism    Myopia of right eye    Parkinson's disease    Presence of other vascular implants and grafts    Pseudophakia of left eye    Pseudophakia of right eye    PVD (posterior vitreous detachment), right eye    Regular astigmatism of both eyes with presbyopia    Smokes with greater than 40 pack year history    Stage 3b chronic kidney disease (CMS/HCC)    Tinea    Nicotine dependence, unspecified, uncomplicated    Tobacco dependence    Tremor, unspecified    Cigarette nicotine dependence without complication    Endoleak post (EVAR) endovascular aneurysm repair, initial encounter    Intertrigo    Low back pain    Nail disease    Prediabetes    Pruritus ani    Subconjunctival hemorrhage        SH:    Social Determinants  of Health     Tobacco Use: High Risk (12/22/2023)    Patient History     Smoking Tobacco Use: Every Day     Smokeless Tobacco Use: Never     Passive Exposure: Not on file   Alcohol Use: Not on file   Financial Resource Strain: Not on file   Food Insecurity: Not on file   Transportation Needs: Not on file   Physical Activity: Not on file   Stress: Not on file   Social Connections: Not on file   Intimate Partner Violence: Not on file   Depression: Not on file   Housing Stability: Not on file   Utilities: Not on file   Digital Equity: Not on file        FH:  No family history on file.     Allergies:   No Known Allergies    ROS:  All systems were reviewed and noted to be negative, other than described above.     Objective:  Last Recorded Vitals  There were no vitals taken for this visit.    Meds:   Current Outpatient Medications   Medication Instructions    aspirin 81 mg chewable tablet 1 tablet, oral, Daily    aspirin 81 mg EC tablet 1 tablet, oral, Daily    atorvastatin (LIPITOR) 40 mg, oral, Nightly    carbidopa-levodopa (Sinemet)  mg tablet 1 tablet, oral, 3 times daily    methocarbamol (Robaxin) 750 mg tablet TAKE 1 TABLET BY MOUTH EVERY 8 HOURS AS NEEDED FOR LEG PAIN OR CRAMPING    metoprolol succinate XL (TOPROL-XL) 25 mg, oral, Daily    multivit-min-FA-lycopen-lutein (Men 50 Plus Multivitamin) 300-600-300 mcg tablet 1 tablet, oral, Daily    triamterene-hydrochlorothiazid (Dyazide) 37.5-25 mg capsule 1 capsule, oral, Every morning       Exam:  Unable to obtain - virtual visit     Imaging Reviewed:  Vascular US ankle brachial index (TITA) without exercise 12/20/2023    [place] [id number]  Tel [phone] and Fax [phone]      Vascular Lab Report  [facility] US ANKLE BRACHIAL INDEX (TITA) WITHOUT EXERCISE      Patient Name:      ASHELY Hoover Physician:  [id number] Wise MD  Study Date:        12/20/2023           Ordering Physician:  [id number] JO ANN GONZALEZ  MRN/PID:           [id number]             Technologist:       Colette Moncada [facility]  Accession#:        [id number]         Technologist 2:  Date of Birth/Age: 1945 / 77      Encounter#:         [id number]  years  Gender:            M  Admission Status:  Outpatient           Location Performed: [facility]      Diagnosis/ICD: Other specified peripheral vascular diseases-I73.89  Indication:    Atherosclerosis of native arteries-extremities w/rest pain  CPT Codes:     42798 Peripheral artery TITA Only      CONCLUSIONS:  Right Lower PVR: Evidence of mild arterial occlusive disease in the right lower extremity at rest. Biphasic flow is noted in the right posterior tibial artery and right dorsalis pedis artery. Triphasic flow is noted in the right common femoral artery.  Left Lower PVR: No evidence of arterial occlusive disease in the left lower extremity at rest. Biphasic flow is noted in the left posterior tibial artery and left dorsalis pedis artery. Triphasic flow is noted in the left common femoral artery.    Comparison:  Compared with study from 11/30/2022, no significant change.    Imaging & Doppler Findings:    RIGHT Lower PVR                Pressures Ratios  Right Posterior Tibial (Ankle) 101 mmHg  0.84  Right Dorsalis Pedis (Ankle)   97 mmHg   0.81  Right Digit (Great Toe)        145 mmHg  1.21        LEFT Lower PVR                Pressures Ratios  Left Posterior Tibial (Ankle) 135 mmHg  1.12  Left Dorsalis Pedis (Ankle)   137 mmHg  1.14  Left Digit (Great Toe)        80 mmHg   0.67      Right     Left  Brachial Pressure 120 mmHg 104 mmHg        [id number] Misael Wise MD  Electronically signed by [id number] Misael Wise MD on 12/20/2023 at 9:19:26 AM        ** Final **  CT angio aorta and bilateral iliofemoral runoff w and or wo IV contrast 12/21/2023    Narrative  Interpreted By:  Octavio Basurto  and Rebekah Bolivar  STUDY:  CT ANGIO AORTA AND BILATERAL ILIOFEMORAL RUNOFF W AND OR WO  IV  CONTRAST;  12/21/2023 2:54 pm    INDICATION:  Signs/Symptoms:AAA s/p EVAR; popliteal artery aneurysms.    COMPARISON:  CT 12/21/2015    ACCESSION NUMBER(S):  [id number]    ORDERING CLINICIAN:  JO ANN GONZALEZ    PROCEDURE:  CT ANGIOGRAM OF THE ABDOMEN, PELVIS, AND BILATERAL LOWER EXTREMITIES    TECHNIQUE:  High-resolution contrast-enhanced helical CT of the  abdomen, pelvis  and both lower extremities  was performed, without contrast and time  to the arterial phase. 3-D processing was performed by the physician  on an independent work station, with MIP and volume-rendering  techniques.  Total of 100 ml of Omnipaque 350 was injected  intravenously during the examination.  The study was performed  without oral contrast.  The patient tolerated the injection without  complications.    FINDINGS:  VASCULAR:  Images of the aorta demonstrate aorto bi-iliac stent is visualized.  Celiac artery demonstrates no significant focal stenosis.  Superior mesenteric artery demonstrates no significant focal stenosis.  Inferior mesenteric artery demonstrates no significant focal stenosis.  There  is a single right renal artery.  Right renal artery  demonstrates no significant focal stenosis.  There  is a single renal  vein which is patent. There  is a single left renal artery.  Left  renal artery demonstrates no significant focal stenosis.  There  is a  single renal vein which is patent.    RIGHT LEG:  Right common iliac artery demonstrates stent which appears patent..  Right external iliac artery demonstrates diffuse atherosclerotic  change with less than 30% stenosis. Right internal iliac artery  demonstrates multiple foci of moderate to high grade stenoses. Right  common femoral artery demonstrates diffuse atherosclerotic change  with less than 30% stenosis. Right profunda femoris artery is widely  patent with no significant stenosis. Right superficial femoral artery  demonstrates multiple foci of moderate to high grade stenoses.  Right  popliteal artery demonstrates a right popliteal artery aneurysm  measuring 1.7 x 1.4 x 2.1 cm which is largely thrombosed. The patent  lumen measures 0.6 x 0.5 cm. Right anterior tibial artery  demonstrates diffuse atherosclerotic change with less than 30%  stenosis. Dorsalis pedis is seen and normal in appearance.  Right tibioperoneal trunk is widely patent with no significant  stenosis. Right posterior tibial artery demonstrates diffuse  atherosclerotic change with less than 30% stenosis. Posterior tibial  artery is normal in caliber and seen to the foot. Right peroneal  artery demonstrates diffuse atherosclerotic change with less than 30%  stenosis.    LEFT LEG:  Left common iliac artery demonstrates stent which appears patent.  Left external iliac artery demonstrates diffuse atherosclerotic  change with less than 30% stenosis. Left internal iliac artery  demonstrates multiple foci of moderate to high grade stenoses. Left  common femoral artery demonstrates diffuse atherosclerotic change  with less than 30% stenosis. Left profunda femoris artery is widely  patent with no significant stenosis. Left superficial femoral artery  demonstrates focal high grade (> 70 %) stenosis. Left popliteal  artery demonstrates diffuse atherosclerotic change with less than 30%  stenosis. Left anterior tibial artery demonstrates diffuse  atherosclerotic change with less than 30% stenosis. Dorsalis pedis is  seen and normal in appearance. Left tibioperoneal trunk demonstrates  diffuse atherosclerotic change with less than 30% stenosis. Left  posterior tibial artery demonstrates diffuse atherosclerotic change  with less than 30% stenosis. Posterior tibial artery is normal in  caliber and seen to the foot. Left peroneal artery demonstrates  diffuse atherosclerotic change with less than 30% stenosis.      NONVASCULAR FINDINGS:  Images through the lung bases demonstrate no acute pulmonary  parenchymal abnormality or pleural  effusion.    LIVER: No significant parenchymal abnormality.  BILE DUCTS: No significant intrahepatic or extrahepatic dilatation.  GALLBLADDER: No significant abnormality.  PANCREAS: No significant abnormality.  SPLEEN: No significant abnormality.  ADRENALS: No significant abnormality.  KIDNEYS, URETERS, BLADDER: Cystic lesions in the bilateral kidneys  likely representing simple renal cysts. No hydroureteronephrosis or  nephroureterolithiasis. No bladder wall thickening. REPRODUCTIVE  ORGANS: No significant abnormality. VESSELS: (See above). No  additional significant abnormality. RETROPERITONEUM/LYMPH NODES: No  enlarged lymph nodes. BOWEL/MESENTERY/PERITONEUM: No inflammatory  bowel wall thickening or dilatation.  Normal appendix. No significant  ascites, free air, or fluid collection. ABDOMINAL WALL: No  significant abnormality. OSSEOUS STRUCTURES: Moderate degenerative  changes of the visualized thoracolumbar spine. No acute osseous  abnormality.    Impression  1. Right popliteal artery aneurysm measuring 1.7 x 1.4 x 2.1 cm which  is largely thrombosed. The patent lumen measures 0.6 x 0.5 cm.  Recommend correlation with prior imaging from outside hospital if  available.  2. Aorto bi-iliac stent which appears patent. No evidence of endoleak.  3. Severe atherosclerotic changes of the bilateral lower extremities  with areas of high-grade stenosis involving the superficial femoral  arteries. Additional areas of mild-to-moderate stenosis as described  above.  4. Additional chronic findings as noted above.    I personally reviewed the images/study and I agree with the findings  as stated by Osmel Welch MD. This study was interpreted at  [place], [place].    MACRO:  None.    Signed by: Octavio Basurto 12/22/2023 12:19 PM  Dictation workstation:   [id number]      Assessment & Plan:  1. Aneurysm of popliteal artery (CMS/HCC)  Vascular US lower extremity vein mapping  bilateral    Nuclear Stress Test    Vascular US carotid artery duplex bilateral    Case Request Operating Room: Creation Bypass Graft Femoral Popliteal Artery, Angiogram Lower Extremity    Case Request Operating Room: Creation Bypass Graft Femoral Popliteal Artery, Angiogram Lower Extremity      2. Preoperative cardiovascular examination  Nuclear Stress Test      3. Carotid stenosis, asymptomatic, bilateral  Vascular US carotid artery duplex bilateral      4. Encounter for other preprocedural examination  Vascular US lower extremity vein mapping bilateral        Right popliteal artery aneurysm with right leg claudication and occasional rest pain.   - obtain carotid duplex, CHRISTIANO, vein mapping  - return for office visit next week  - plan for right leg bypass grafting    Marycarmen Ca, APRN-CNP

## 2024-01-04 ENCOUNTER — HOSPITAL ENCOUNTER (OUTPATIENT)
Facility: HOSPITAL | Age: 79
Setting detail: SURGERY ADMIT
End: 2024-01-04
Attending: SURGERY | Admitting: SURGERY
Payer: MEDICARE

## 2024-01-05 ENCOUNTER — HOSPITAL ENCOUNTER (OUTPATIENT)
Dept: CARDIOLOGY | Facility: HOSPITAL | Age: 79
Discharge: HOME | End: 2024-01-05
Payer: MEDICARE

## 2024-01-05 ENCOUNTER — HOSPITAL ENCOUNTER (OUTPATIENT)
Dept: RADIOLOGY | Facility: HOSPITAL | Age: 79
Discharge: HOME | End: 2024-01-05
Payer: MEDICARE

## 2024-01-05 ENCOUNTER — APPOINTMENT (OUTPATIENT)
Dept: VASCULAR MEDICINE | Facility: HOSPITAL | Age: 79
End: 2024-01-05
Payer: MEDICARE

## 2024-01-05 DIAGNOSIS — I72.4 ANEURYSM OF POPLITEAL ARTERY (CMS-HCC): ICD-10-CM

## 2024-01-05 DIAGNOSIS — Z01.810 PREOPERATIVE CARDIOVASCULAR EXAMINATION: ICD-10-CM

## 2024-01-05 PROCEDURE — A9502 TC99M TETROFOSMIN: HCPCS | Performed by: NURSE PRACTITIONER

## 2024-01-05 PROCEDURE — 93018 CV STRESS TEST I&R ONLY: CPT | Performed by: INTERNAL MEDICINE

## 2024-01-05 PROCEDURE — 93017 CV STRESS TEST TRACING ONLY: CPT

## 2024-01-05 PROCEDURE — 78452 HT MUSCLE IMAGE SPECT MULT: CPT | Performed by: NUCLEAR MEDICINE

## 2024-01-05 PROCEDURE — 2500000004 HC RX 250 GENERAL PHARMACY W/ HCPCS (ALT 636 FOR OP/ED): Performed by: NURSE PRACTITIONER

## 2024-01-05 PROCEDURE — 93017 CV STRESS TEST TRACING ONLY: CPT | Mod: MUE

## 2024-01-05 PROCEDURE — 3430000001 HC RX 343 DIAGNOSTIC RADIOPHARMACEUTICALS: Performed by: NURSE PRACTITIONER

## 2024-01-05 PROCEDURE — 93016 CV STRESS TEST SUPVJ ONLY: CPT | Performed by: INTERNAL MEDICINE

## 2024-01-05 PROCEDURE — 78452 HT MUSCLE IMAGE SPECT MULT: CPT

## 2024-01-05 RX ORDER — REGADENOSON 0.08 MG/ML
0.4 INJECTION, SOLUTION INTRAVENOUS
Status: COMPLETED | OUTPATIENT
Start: 2024-01-05 | End: 2024-01-05

## 2024-01-05 RX ADMIN — TETROFOSMIN 9.7 MILLICURIE: 0.23 INJECTION, POWDER, LYOPHILIZED, FOR SOLUTION INTRAVENOUS at 10:26

## 2024-01-05 RX ADMIN — REGADENOSON 0.4 MG: 0.08 INJECTION, SOLUTION INTRAVENOUS at 12:32

## 2024-01-05 RX ADMIN — TETROFOSMIN 29.3 MILLICURIE: 0.23 INJECTION, POWDER, LYOPHILIZED, FOR SOLUTION INTRAVENOUS at 12:35

## 2024-01-08 ENCOUNTER — TELEPHONE (OUTPATIENT)
Dept: VASCULAR MEDICINE | Facility: HOSPITAL | Age: 79
End: 2024-01-08
Payer: MEDICARE

## 2024-01-08 DIAGNOSIS — I99.8 OTHER DISORDER OF CIRCULATORY SYSTEM: ICD-10-CM

## 2024-01-08 DIAGNOSIS — I72.4 ANEURYSM OF POPLITEAL ARTERY (CMS-HCC): Primary | ICD-10-CM

## 2024-01-08 DIAGNOSIS — I73.9 PAD (PERIPHERAL ARTERY DISEASE) (CMS-HCC): ICD-10-CM

## 2024-01-08 NOTE — TELEPHONE ENCOUNTER
Spoke with patient and sister regarding pre-op instructions for 1/11/24 surgery with Dr. Giraldo.  Patient know to come 1/10/24 for vascular testing appts and office visit with Dr. Giraldo to follow.  He will go to outpatient lab after office visit to obtain needed bloodwork.  Orders have been placed.  Patient advised to be NPO after midnight the night prior to surgery; should take his Metoprolol, triamterene/hydrochlorothiazide, and carbidopa/levodopa medications with small sip of water the morning of surgery; need for inpatient hospital stay; and need for transportation.  Sister and patient verbalized understanding of all instructions.  Encouraged to call with questions/concerns.

## 2024-01-09 PROBLEM — I65.23 BILATERAL CAROTID ARTERY STENOSIS: Status: ACTIVE | Noted: 2024-01-09

## 2024-01-09 PROBLEM — I78.1 TELANGIECTASIA OF SKIN: Status: ACTIVE | Noted: 2024-01-09

## 2024-01-09 PROBLEM — F41.9 ANXIETY: Status: ACTIVE | Noted: 2024-01-09

## 2024-01-09 RX ORDER — DOCUSATE SODIUM 100 MG/1
CAPSULE, LIQUID FILLED ORAL
COMMUNITY
Start: 2017-05-10

## 2024-01-09 RX ORDER — TRAMADOL HYDROCHLORIDE 50 MG/1
TABLET ORAL
COMMUNITY
Start: 2024-01-02

## 2024-01-10 ENCOUNTER — HOSPITAL ENCOUNTER (OUTPATIENT)
Dept: VASCULAR MEDICINE | Facility: HOSPITAL | Age: 79
Discharge: HOME | End: 2024-01-10
Payer: MEDICARE

## 2024-01-10 ENCOUNTER — OFFICE VISIT (OUTPATIENT)
Dept: VASCULAR SURGERY | Facility: HOSPITAL | Age: 79
End: 2024-01-10
Payer: MEDICARE

## 2024-01-10 ENCOUNTER — LAB (OUTPATIENT)
Dept: LAB | Facility: LAB | Age: 79
End: 2024-01-10
Payer: MEDICARE

## 2024-01-10 VITALS
HEART RATE: 94 BPM | OXYGEN SATURATION: 97 % | BODY MASS INDEX: 21.87 KG/M2 | SYSTOLIC BLOOD PRESSURE: 122 MMHG | HEIGHT: 74 IN | DIASTOLIC BLOOD PRESSURE: 89 MMHG | WEIGHT: 170.4 LBS

## 2024-01-10 DIAGNOSIS — Z01.818 ENCOUNTER FOR OTHER PREPROCEDURAL EXAMINATION: ICD-10-CM

## 2024-01-10 DIAGNOSIS — I72.4 ANEURYSM OF POPLITEAL ARTERY (CMS-HCC): ICD-10-CM

## 2024-01-10 DIAGNOSIS — R29.818 NEUROGENIC CLAUDICATION: Primary | ICD-10-CM

## 2024-01-10 DIAGNOSIS — I99.8 OTHER DISORDER OF CIRCULATORY SYSTEM: ICD-10-CM

## 2024-01-10 DIAGNOSIS — I71.43 INFRARENAL ABDOMINAL AORTIC ANEURYSM (AAA) WITHOUT RUPTURE (CMS-HCC): ICD-10-CM

## 2024-01-10 DIAGNOSIS — R09.89 OTHER SPECIFIED SYMPTOMS AND SIGNS INVOLVING THE CIRCULATORY AND RESPIRATORY SYSTEMS: ICD-10-CM

## 2024-01-10 DIAGNOSIS — I73.9 PAD (PERIPHERAL ARTERY DISEASE) (CMS-HCC): ICD-10-CM

## 2024-01-10 DIAGNOSIS — I65.23 CAROTID STENOSIS, ASYMPTOMATIC, BILATERAL: ICD-10-CM

## 2024-01-10 LAB
ABO GROUP (TYPE) IN BLOOD: NORMAL
ANION GAP SERPL CALC-SCNC: 17 MMOL/L (ref 10–20)
ANTIBODY SCREEN: NORMAL
APTT PPP: 30 SECONDS (ref 27–38)
BASOPHILS # BLD AUTO: 0.04 X10*3/UL (ref 0–0.1)
BASOPHILS NFR BLD AUTO: 0.7 %
BUN SERPL-MCNC: 28 MG/DL (ref 6–23)
CALCIUM SERPL-MCNC: 10.4 MG/DL (ref 8.6–10.6)
CHLORIDE SERPL-SCNC: 98 MMOL/L (ref 98–107)
CO2 SERPL-SCNC: 29 MMOL/L (ref 21–32)
CREAT SERPL-MCNC: 1.37 MG/DL (ref 0.5–1.3)
EGFRCR SERPLBLD CKD-EPI 2021: 53 ML/MIN/1.73M*2
EOSINOPHIL # BLD AUTO: 0.02 X10*3/UL (ref 0–0.4)
EOSINOPHIL NFR BLD AUTO: 0.3 %
ERYTHROCYTE [DISTWIDTH] IN BLOOD BY AUTOMATED COUNT: 13.1 % (ref 11.5–14.5)
GLUCOSE SERPL-MCNC: 97 MG/DL (ref 74–99)
HCT VFR BLD AUTO: 51 % (ref 41–52)
HGB BLD-MCNC: 16.3 G/DL (ref 13.5–17.5)
IMM GRANULOCYTES # BLD AUTO: 0.01 X10*3/UL (ref 0–0.5)
IMM GRANULOCYTES NFR BLD AUTO: 0.2 % (ref 0–0.9)
INR PPP: 1.1 (ref 0.9–1.1)
LYMPHOCYTES # BLD AUTO: 1.26 X10*3/UL (ref 0.8–3)
LYMPHOCYTES NFR BLD AUTO: 21.6 %
MCH RBC QN AUTO: 30.3 PG (ref 26–34)
MCHC RBC AUTO-ENTMCNC: 32 G/DL (ref 32–36)
MCV RBC AUTO: 95 FL (ref 80–100)
MONOCYTES # BLD AUTO: 0.34 X10*3/UL (ref 0.05–0.8)
MONOCYTES NFR BLD AUTO: 5.8 %
NEUTROPHILS # BLD AUTO: 4.15 X10*3/UL (ref 1.6–5.5)
NEUTROPHILS NFR BLD AUTO: 71.4 %
NRBC BLD-RTO: 0 /100 WBCS (ref 0–0)
PLATELET # BLD AUTO: 166 X10*3/UL (ref 150–450)
POTASSIUM SERPL-SCNC: 3.6 MMOL/L (ref 3.5–5.3)
PROTHROMBIN TIME: 11.9 SECONDS (ref 9.8–12.8)
RBC # BLD AUTO: 5.38 X10*6/UL (ref 4.5–5.9)
RH FACTOR (ANTIGEN D): NORMAL
SODIUM SERPL-SCNC: 140 MMOL/L (ref 136–145)
WBC # BLD AUTO: 5.8 X10*3/UL (ref 4.4–11.3)

## 2024-01-10 PROCEDURE — 3074F SYST BP LT 130 MM HG: CPT | Performed by: NURSE PRACTITIONER

## 2024-01-10 PROCEDURE — 99214 OFFICE O/P EST MOD 30 MIN: CPT | Performed by: NURSE PRACTITIONER

## 2024-01-10 PROCEDURE — 85730 THROMBOPLASTIN TIME PARTIAL: CPT

## 2024-01-10 PROCEDURE — 93880 EXTRACRANIAL BILAT STUDY: CPT | Performed by: SURGERY

## 2024-01-10 PROCEDURE — 86850 RBC ANTIBODY SCREEN: CPT

## 2024-01-10 PROCEDURE — 1159F MED LIST DOCD IN RCRD: CPT | Performed by: NURSE PRACTITIONER

## 2024-01-10 PROCEDURE — 36415 COLL VENOUS BLD VENIPUNCTURE: CPT

## 2024-01-10 PROCEDURE — 93970 EXTREMITY STUDY: CPT | Performed by: SURGERY

## 2024-01-10 PROCEDURE — 80048 BASIC METABOLIC PNL TOTAL CA: CPT

## 2024-01-10 PROCEDURE — 93880 EXTRACRANIAL BILAT STUDY: CPT

## 2024-01-10 PROCEDURE — 86900 BLOOD TYPING SEROLOGIC ABO: CPT

## 2024-01-10 PROCEDURE — 1125F AMNT PAIN NOTED PAIN PRSNT: CPT | Performed by: NURSE PRACTITIONER

## 2024-01-10 PROCEDURE — 86901 BLOOD TYPING SEROLOGIC RH(D): CPT

## 2024-01-10 PROCEDURE — 85610 PROTHROMBIN TIME: CPT

## 2024-01-10 PROCEDURE — 93970 EXTREMITY STUDY: CPT

## 2024-01-10 PROCEDURE — 85025 COMPLETE CBC W/AUTO DIFF WBC: CPT

## 2024-01-10 PROCEDURE — 3079F DIAST BP 80-89 MM HG: CPT | Performed by: NURSE PRACTITIONER

## 2024-01-10 ASSESSMENT — LIFESTYLE VARIABLES: HOW OFTEN DO YOU HAVE A DRINK CONTAINING ALCOHOL: MONTHLY OR LESS

## 2024-01-10 ASSESSMENT — PAIN SCALES - GENERAL: PAINLEVEL: 10-WORST PAIN EVER

## 2024-01-10 ASSESSMENT — PATIENT HEALTH QUESTIONNAIRE - PHQ9
SUM OF ALL RESPONSES TO PHQ9 QUESTIONS 1 AND 2: 1
2. FEELING DOWN, DEPRESSED OR HOPELESS: SEVERAL DAYS
10. IF YOU CHECKED OFF ANY PROBLEMS, HOW DIFFICULT HAVE THESE PROBLEMS MADE IT FOR YOU TO DO YOUR WORK, TAKE CARE OF THINGS AT HOME, OR GET ALONG WITH OTHER PEOPLE: SOMEWHAT DIFFICULT
1. LITTLE INTEREST OR PLEASURE IN DOING THINGS: NOT AT ALL

## 2024-01-10 ASSESSMENT — COLUMBIA-SUICIDE SEVERITY RATING SCALE - C-SSRS
2. HAVE YOU ACTUALLY HAD ANY THOUGHTS OF KILLING YOURSELF?: NO
6. HAVE YOU EVER DONE ANYTHING, STARTED TO DO ANYTHING, OR PREPARED TO DO ANYTHING TO END YOUR LIFE?: NO
1. IN THE PAST MONTH, HAVE YOU WISHED YOU WERE DEAD OR WISHED YOU COULD GO TO SLEEP AND NOT WAKE UP?: NO

## 2024-01-26 NOTE — PROGRESS NOTES
Vascular Surgery Clinic Note    CC: FUV AAA, popliteal artery aneurysm     History Of Present Illness:   Jonny Gonzalez is a 78 y.o. male here for routine follow up. He underwent an EVAR in 2014 by Dr. 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 He developed right leg pain a few months ago that he initially described as claudication, but is now describing as constant pain in his right thigh. He does have a history of back pain.     He denies any known family history of aneurysmal disease.     Medical History:  Patient Active Problem List   Diagnosis    Aneurysm of infrarenal abdominal aorta (CMS/HCC)    Aneurysm of popliteal artery (CMS/HCC)    Atherosclerotic vascular disease    Blurry vision    Combined form of age-related cataract, left eye    Combined form of senile cataract of both eyes    Constipation    Dry skin    Hypercholesteremia    Hypermetropia of left eye    Hypertension    Hypokalemia    Insect bite    Myopia of both eyes with regular astigmatism    Myopia of right eye    Parkinson's disease    Presence of other vascular implants and grafts    Pseudophakia of left eye    Pseudophakia of right eye    PVD (posterior vitreous detachment), right eye    Regular astigmatism of both eyes with presbyopia    Smokes with greater than 40 pack year history    Stage 3b chronic kidney disease (CMS/HCC)    Tinea    Nicotine dependence, unspecified, uncomplicated    Tobacco dependence    Tremor, unspecified    Cigarette nicotine dependence without complication    Endoleak post (EVAR) endovascular aneurysm repair, initial encounter    Intertrigo    Low back pain    Nail disease    Prediabetes    Pruritus ani    Subconjunctival hemorrhage    Anxiety    Bilateral carotid artery stenosis    Telangiectasia of skin        SH:    Social Determinants of Health     Tobacco Use: High Risk (1/10/2024)    Patient History     Smoking Tobacco Use: Every Day     Smokeless Tobacco Use:  "Never     Passive Exposure: Not on file   Alcohol Use: Unknown (1/10/2024)    AUDIT-C     Frequency of Alcohol Consumption: Monthly or less     Average Number of Drinks: Not on file     Frequency of Binge Drinking: Not on file   Financial Resource Strain: Not on file   Food Insecurity: Not on file   Transportation Needs: Not on file   Physical Activity: Not on file   Stress: Not on file   Social Connections: Not on file   Intimate Partner Violence: Not on file   Depression: Not at risk (1/10/2024)    PHQ-2     PHQ-2 Score: 1   Housing Stability: Not on file   Utilities: Not on file   Digital Equity: Not on file        FH:  No family history on file.     Allergies:   No Known Allergies    ROS:  All systems were reviewed and noted to be negative, other than described above.     Objective:  Last Recorded Vitals  Blood pressure 122/89, pulse 94, height 1.88 m (6' 2\"), weight 77.3 kg (170 lb 6.4 oz), SpO2 97 %.    Meds:   Current Outpatient Medications   Medication Instructions    aspirin 81 mg chewable tablet 1 tablet, oral, Daily    aspirin 81 mg EC tablet 1 tablet, oral, Daily    atorvastatin (LIPITOR) 40 mg, oral, Nightly    carbidopa-levodopa (Sinemet)  mg tablet 1 tablet, oral, 3 times daily    docusate sodium (Colace) 100 mg capsule oral    methocarbamol (Robaxin) 750 mg tablet TAKE 1 TABLET BY MOUTH EVERY 8 HOURS AS NEEDED FOR LEG PAIN OR CRAMPING    metoprolol succinate XL (TOPROL-XL) 25 mg, oral, Daily    multivit-min-FA-lycopen-lutein (Men 50 Plus Multivitamin) 300-600-300 mcg tablet 1 tablet, oral, Daily    traMADol (Ultram) 50 mg tablet TAKE 1 TABLET BY MOUTH EVERY 12 HOURS AS NEEDED FOR SEVERE PAIN    triamterene-hydrochlorothiazid (Dyazide) 37.5-25 mg capsule 1 capsule, oral, Every morning       Exam:  Constitutional: Well appearing, NAD   PSYCH: Appropriate mood and affect  Eyes: Sclera clear   Neck: Supple   CV: No tachycardia   RESP: Unlabored breathing  GI: Soft, nontender, non-distended.   SKIN: " No lesions  NEURO: No focal deficits noted. Motor/sensory intact.   EXTREMITIES: Warm & well perfused. No leg edema. No evidence of arterial ischemia. No evidence of venous insufficiency.   PULSES: palpable PT pulses     Imaging Reviewed:  Vascular US ankle brachial index (TITA) without exercise 12/20/2023    [place] [id number]  Tel [phone] and Fax [phone]      Vascular Lab Report  [facility] US ANKLE BRACHIAL INDEX (TITA) WITHOUT EXERCISE      Patient Name:      ASHELY Hoover Physician:  [id number] Misael Wise MD  Study Date:        12/20/2023           Ordering Physician: [id number] JO ANN GONZALEZ  MRN/PID:           [id number]             Technologist:       Colette LEVY  Accession#:        [id number]         Technologist 2:  Date of Birth/Age: 1945 / 77      Encounter#:         [id number]  years  Gender:            M  Admission Status:  Outpatient           Location Performed: [facility]      Diagnosis/ICD: Other specified peripheral vascular diseases-I73.89  Indication:    Atherosclerosis of native arteries-extremities w/rest pain  CPT Codes:     79528 Peripheral artery TITA Only      CONCLUSIONS:  Right Lower PVR: Evidence of mild arterial occlusive disease in the right lower extremity at rest. Biphasic flow is noted in the right posterior tibial artery and right dorsalis pedis artery. Triphasic flow is noted in the right common femoral artery.  Left Lower PVR: No evidence of arterial occlusive disease in the left lower extremity at rest. Biphasic flow is noted in the left posterior tibial artery and left dorsalis pedis artery. Triphasic flow is noted in the left common femoral artery.    Comparison:  Compared with study from 11/30/2022, no significant change.    Imaging & Doppler Findings:    RIGHT Lower PVR                Pressures Ratios  Right Posterior Tibial (Ankle) 101 mmHg  0.84  Right Dorsalis Pedis (Ankle)   97  mmHg   0.81  Right Digit (Great Toe)        145 mmHg  1.21        LEFT Lower PVR                Pressures Ratios  Left Posterior Tibial (Ankle) 135 mmHg  1.12  Left Dorsalis Pedis (Ankle)   137 mmHg  1.14  Left Digit (Great Toe)        80 mmHg   0.67      Right     Left  Brachial Pressure 120 mmHg 104 mmHg        [id number] Misael Wise MD  Electronically signed by [id number] Misael Wise MD on 12/20/2023 at 9:19:26 AM        ** Final **  CT angio aorta and bilateral iliofemoral runoff w and or wo IV contrast 12/21/2023    Narrative  Interpreted By:  Octavio Basurto  and Rebekah Bolivar  STUDY:  CT ANGIO AORTA AND BILATERAL ILIOFEMORAL RUNOFF W AND OR WO IV  CONTRAST;  12/21/2023 2:54 pm    INDICATION:  Signs/Symptoms:AAA s/p EVAR; popliteal artery aneurysms.    COMPARISON:  CT 12/21/2015    ACCESSION NUMBER(S):  [id number]    ORDERING CLINICIAN:  JO ANN GONZALEZ    PROCEDURE:  CT ANGIOGRAM OF THE ABDOMEN, PELVIS, AND BILATERAL LOWER EXTREMITIES    TECHNIQUE:  High-resolution contrast-enhanced helical CT of the  abdomen, pelvis  and both lower extremities  was performed, without contrast and time  to the arterial phase. 3-D processing was performed by the physician  on an independent work station, with MIP and volume-rendering  techniques.  Total of 100 ml of Omnipaque 350 was injected  intravenously during the examination.  The study was performed  without oral contrast.  The patient tolerated the injection without  complications.    FINDINGS:  VASCULAR:  Images of the aorta demonstrate aorto bi-iliac stent is visualized.  Celiac artery demonstrates no significant focal stenosis.  Superior mesenteric artery demonstrates no significant focal stenosis.  Inferior mesenteric artery demonstrates no significant focal stenosis.  There  is a single right renal artery.  Right renal artery  demonstrates no significant focal stenosis.  There  is a single renal  vein which is patent. There  is a single left renal artery.  Left  renal  artery demonstrates no significant focal stenosis.  There  is a  single renal vein which is patent.    RIGHT LEG:  Right common iliac artery demonstrates stent which appears patent..  Right external iliac artery demonstrates diffuse atherosclerotic  change with less than 30% stenosis. Right internal iliac artery  demonstrates multiple foci of moderate to high grade stenoses. Right  common femoral artery demonstrates diffuse atherosclerotic change  with less than 30% stenosis. Right profunda femoris artery is widely  patent with no significant stenosis. Right superficial femoral artery  demonstrates multiple foci of moderate to high grade stenoses. Right  popliteal artery demonstrates a right popliteal artery aneurysm  measuring 1.7 x 1.4 x 2.1 cm which is largely thrombosed. The patent  lumen measures 0.6 x 0.5 cm. Right anterior tibial artery  demonstrates diffuse atherosclerotic change with less than 30%  stenosis. Dorsalis pedis is seen and normal in appearance.  Right tibioperoneal trunk is widely patent with no significant  stenosis. Right posterior tibial artery demonstrates diffuse  atherosclerotic change with less than 30% stenosis. Posterior tibial  artery is normal in caliber and seen to the foot. Right peroneal  artery demonstrates diffuse atherosclerotic change with less than 30%  stenosis.    LEFT LEG:  Left common iliac artery demonstrates stent which appears patent.  Left external iliac artery demonstrates diffuse atherosclerotic  change with less than 30% stenosis. Left internal iliac artery  demonstrates multiple foci of moderate to high grade stenoses. Left  common femoral artery demonstrates diffuse atherosclerotic change  with less than 30% stenosis. Left profunda femoris artery is widely  patent with no significant stenosis. Left superficial femoral artery  demonstrates focal high grade (> 70 %) stenosis. Left popliteal  artery demonstrates diffuse atherosclerotic change with less than  30%  stenosis. Left anterior tibial artery demonstrates diffuse  atherosclerotic change with less than 30% stenosis. Dorsalis pedis is  seen and normal in appearance. Left tibioperoneal trunk demonstrates  diffuse atherosclerotic change with less than 30% stenosis. Left  posterior tibial artery demonstrates diffuse atherosclerotic change  with less than 30% stenosis. Posterior tibial artery is normal in  caliber and seen to the foot. Left peroneal artery demonstrates  diffuse atherosclerotic change with less than 30% stenosis.      NONVASCULAR FINDINGS:  Images through the lung bases demonstrate no acute pulmonary  parenchymal abnormality or pleural effusion.    LIVER: No significant parenchymal abnormality.  BILE DUCTS: No significant intrahepatic or extrahepatic dilatation.  GALLBLADDER: No significant abnormality.  PANCREAS: No significant abnormality.  SPLEEN: No significant abnormality.  ADRENALS: No significant abnormality.  KIDNEYS, URETERS, BLADDER: Cystic lesions in the bilateral kidneys  likely representing simple renal cysts. No hydroureteronephrosis or  nephroureterolithiasis. No bladder wall thickening. REPRODUCTIVE  ORGANS: No significant abnormality. VESSELS: (See above). No  additional significant abnormality. RETROPERITONEUM/LYMPH NODES: No  enlarged lymph nodes. BOWEL/MESENTERY/PERITONEUM: No inflammatory  bowel wall thickening or dilatation.  Normal appendix. No significant  ascites, free air, or fluid collection. ABDOMINAL WALL: No  significant abnormality. OSSEOUS STRUCTURES: Moderate degenerative  changes of the visualized thoracolumbar spine. No acute osseous  abnormality.    Impression  1. Right popliteal artery aneurysm measuring 1.7 x 1.4 x 2.1 cm which  is largely thrombosed. The patent lumen measures 0.6 x 0.5 cm.  Recommend correlation with prior imaging from outside hospital if  available.  2. Aorto bi-iliac stent which appears patent. No evidence of endoleak.  3. Severe atherosclerotic  changes of the bilateral lower extremities  with areas of high-grade stenosis involving the superficial femoral  arteries. Additional areas of mild-to-moderate stenosis as described  above.  4. Additional chronic findings as noted above.    I personally reviewed the images/study and I agree with the findings  as stated by Osmel Welch MD. This study was interpreted at  [place], [place].    MACRO:  None.    Signed by: Octavio Basurto 12/22/2023 12:19 PM  Dictation workstation:   [id number]      Assessment & Plan:  1. Neurogenic claudication  Referral to Orthopaedic Surgery      2. Aneurysm of popliteal artery (CMS/HCC)        3. Infrarenal abdominal aortic aneurysm (AAA) without rupture (CMS/HCC)          S/p EVAR 2014: patent, no endoleak.   Right popliteal artery aneurysm 1.9 cm.   TITA is 0.84 on the right and 1.14 on the left.   Constant right leg pain is neurogenic in nature.   Referral to ortho.   Return to clinic in 3 months after ortho eval.     Orders:  Orders Placed This Encounter   Procedures    Referral to Orthopaedic Surgery     Follow-up:  Follow up in about 3 months (around 4/10/2024).    Marycarmen Ca, APRN-CNP